# Patient Record
Sex: FEMALE | Race: BLACK OR AFRICAN AMERICAN
[De-identification: names, ages, dates, MRNs, and addresses within clinical notes are randomized per-mention and may not be internally consistent; named-entity substitution may affect disease eponyms.]

---

## 2022-10-11 ENCOUNTER — HOSPITAL ENCOUNTER (INPATIENT)
Dept: HOSPITAL 74 - YASAS | Age: 27
LOS: 3 days | Discharge: LEFT BEFORE DISCHARGE | DRG: 770 | End: 2022-10-14
Attending: SURGERY | Admitting: ALLERGY & IMMUNOLOGY
Payer: COMMERCIAL

## 2022-10-11 VITALS — BODY MASS INDEX: 48.3 KG/M2

## 2022-10-11 DIAGNOSIS — F10.230: Primary | ICD-10-CM

## 2022-10-11 DIAGNOSIS — I10: ICD-10-CM

## 2022-10-11 DIAGNOSIS — F43.10: ICD-10-CM

## 2022-10-11 DIAGNOSIS — F31.9: ICD-10-CM

## 2022-10-11 DIAGNOSIS — F19.282: ICD-10-CM

## 2022-10-11 DIAGNOSIS — L73.2: ICD-10-CM

## 2022-10-11 DIAGNOSIS — F17.210: ICD-10-CM

## 2022-10-11 DIAGNOSIS — F19.280: ICD-10-CM

## 2022-10-11 DIAGNOSIS — F12.20: ICD-10-CM

## 2022-10-11 DIAGNOSIS — E66.9: ICD-10-CM

## 2022-10-11 PROCEDURE — U0005 INFEC AGEN DETEC AMPLI PROBE: HCPCS

## 2022-10-11 PROCEDURE — U0003 INFECTIOUS AGENT DETECTION BY NUCLEIC ACID (DNA OR RNA); SEVERE ACUTE RESPIRATORY SYNDROME CORONAVIRUS 2 (SARS-COV-2) (CORONAVIRUS DISEASE [COVID-19]), AMPLIFIED PROBE TECHNIQUE, MAKING USE OF HIGH THROUGHPUT TECHNOLOGIES AS DESCRIBED BY CMS-2020-01-R: HCPCS

## 2022-10-11 PROCEDURE — HZ2ZZZZ DETOXIFICATION SERVICES FOR SUBSTANCE ABUSE TREATMENT: ICD-10-PCS | Performed by: SURGERY

## 2022-10-11 RX ADMIN — Medication SCH MG: at 22:35

## 2022-10-11 RX ADMIN — HYDROXYZINE PAMOATE SCH MG: 25 CAPSULE ORAL at 18:06

## 2022-10-11 RX ADMIN — METHOCARBAMOL PRN MG: 500 TABLET ORAL at 18:05

## 2022-10-11 RX ADMIN — NICOTINE SCH: 21 PATCH TRANSDERMAL at 18:06

## 2022-10-11 RX ADMIN — HYDROXYZINE PAMOATE SCH MG: 25 CAPSULE ORAL at 22:35

## 2022-10-12 LAB
ALBUMIN SERPL-MCNC: 2.9 G/DL (ref 3.4–5)
ALP SERPL-CCNC: 72 U/L (ref 45–117)
ALT SERPL-CCNC: 28 U/L (ref 13–61)
ANION GAP SERPL CALC-SCNC: 7 MMOL/L (ref 8–16)
AST SERPL-CCNC: 21 U/L (ref 15–37)
BILIRUB SERPL-MCNC: 0.5 MG/DL (ref 0.2–1)
BUN SERPL-MCNC: 10.1 MG/DL (ref 7–18)
CALCIUM SERPL-MCNC: 8.7 MG/DL (ref 8.5–10.1)
CHLORIDE SERPL-SCNC: 107 MMOL/L (ref 98–107)
CO2 SERPL-SCNC: 27 MMOL/L (ref 21–32)
CREAT SERPL-MCNC: 0.9 MG/DL (ref 0.55–1.3)
DEPRECATED RDW RBC AUTO: 15.3 % (ref 11.6–15.6)
GLUCOSE SERPL-MCNC: 99 MG/DL (ref 74–106)
HCT VFR BLD CALC: 37.1 % (ref 32.4–45.2)
HGB BLD-MCNC: 12.2 GM/DL (ref 10.7–15.3)
MCH RBC QN AUTO: 31.6 PG (ref 25.7–33.7)
MCHC RBC AUTO-ENTMCNC: 32.9 G/DL (ref 32–36)
MCV RBC: 96 FL (ref 80–96)
PLATELET # BLD AUTO: 282 10^3/UL (ref 134–434)
PMV BLD: 8.3 FL (ref 7.5–11.1)
PROT SERPL-MCNC: 6.6 G/DL (ref 6.4–8.2)
RBC # BLD AUTO: 3.86 M/MM3 (ref 3.6–5.2)
SODIUM SERPL-SCNC: 140 MMOL/L (ref 136–145)
WBC # BLD AUTO: 7.5 K/MM3 (ref 4–10)

## 2022-10-12 RX ADMIN — HYDROXYZINE PAMOATE SCH MG: 25 CAPSULE ORAL at 13:30

## 2022-10-12 RX ADMIN — Medication SCH TAB: at 11:11

## 2022-10-12 RX ADMIN — HYDROXYZINE PAMOATE SCH MG: 25 CAPSULE ORAL at 06:45

## 2022-10-12 RX ADMIN — NICOTINE SCH: 21 PATCH TRANSDERMAL at 11:11

## 2022-10-12 RX ADMIN — HYDROXYZINE PAMOATE SCH MG: 25 CAPSULE ORAL at 18:17

## 2022-10-12 RX ADMIN — Medication SCH MG: at 22:22

## 2022-10-12 RX ADMIN — HYDROXYZINE PAMOATE SCH: 25 CAPSULE ORAL at 22:27

## 2022-10-12 RX ADMIN — HYDROXYZINE PAMOATE SCH MG: 25 CAPSULE ORAL at 11:11

## 2022-10-12 RX ADMIN — LISINOPRIL SCH MG: 10 TABLET ORAL at 22:22

## 2022-10-12 RX ADMIN — LISINOPRIL SCH MG: 10 TABLET ORAL at 12:13

## 2022-10-13 RX ADMIN — HYDROXYZINE PAMOATE SCH MG: 25 CAPSULE ORAL at 17:56

## 2022-10-13 RX ADMIN — METHOCARBAMOL PRN MG: 500 TABLET ORAL at 17:56

## 2022-10-13 RX ADMIN — METHOCARBAMOL PRN MG: 500 TABLET ORAL at 10:19

## 2022-10-13 RX ADMIN — HYDROXYZINE PAMOATE SCH MG: 25 CAPSULE ORAL at 10:18

## 2022-10-13 RX ADMIN — LISINOPRIL SCH MG: 10 TABLET ORAL at 10:18

## 2022-10-13 RX ADMIN — LISINOPRIL SCH MG: 10 TABLET ORAL at 22:19

## 2022-10-13 RX ADMIN — Medication SCH MG: at 22:19

## 2022-10-13 RX ADMIN — Medication SCH TAB: at 10:18

## 2022-10-13 RX ADMIN — HYDROXYZINE PAMOATE SCH MG: 25 CAPSULE ORAL at 22:20

## 2022-10-13 RX ADMIN — NICOTINE PRN MG: 4 INHALANT RESPIRATORY (INHALATION) at 10:22

## 2022-10-13 RX ADMIN — HYDROXYZINE PAMOATE SCH MG: 25 CAPSULE ORAL at 05:36

## 2022-10-13 RX ADMIN — HYDROXYZINE PAMOATE SCH MG: 25 CAPSULE ORAL at 13:52

## 2022-10-13 RX ADMIN — NICOTINE SCH: 21 PATCH TRANSDERMAL at 10:19

## 2022-10-14 VITALS — RESPIRATION RATE: 18 BRPM

## 2022-10-14 VITALS — DIASTOLIC BLOOD PRESSURE: 98 MMHG | TEMPERATURE: 98 F | SYSTOLIC BLOOD PRESSURE: 141 MMHG

## 2022-10-14 VITALS — HEART RATE: 104 BPM

## 2022-10-14 RX ADMIN — METHOCARBAMOL PRN MG: 500 TABLET ORAL at 10:30

## 2022-10-14 RX ADMIN — HYDROXYZINE PAMOATE SCH MG: 25 CAPSULE ORAL at 05:33

## 2022-10-14 RX ADMIN — Medication SCH TAB: at 10:31

## 2022-10-14 RX ADMIN — NICOTINE SCH: 21 PATCH TRANSDERMAL at 10:31

## 2022-10-14 RX ADMIN — NICOTINE PRN MG: 4 INHALANT RESPIRATORY (INHALATION) at 12:50

## 2023-01-09 ENCOUNTER — INPATIENT (INPATIENT)
Facility: HOSPITAL | Age: 28
LOS: 0 days | Discharge: LEFT BEFORE DISCHARGE | DRG: 103 | End: 2023-01-10
Attending: INTERNAL MEDICINE | Admitting: INTERNAL MEDICINE
Payer: MEDICAID

## 2023-01-09 VITALS
WEIGHT: 283.51 LBS | SYSTOLIC BLOOD PRESSURE: 177 MMHG | OXYGEN SATURATION: 100 % | RESPIRATION RATE: 18 BRPM | HEART RATE: 92 BPM | TEMPERATURE: 98 F | DIASTOLIC BLOOD PRESSURE: 134 MMHG

## 2023-01-09 LAB
ALBUMIN SERPL ELPH-MCNC: 3.5 G/DL — SIGNIFICANT CHANGE UP (ref 3.5–5)
ALP SERPL-CCNC: 74 U/L — SIGNIFICANT CHANGE UP (ref 40–120)
ALT FLD-CCNC: 26 U/L DA — SIGNIFICANT CHANGE UP (ref 10–60)
ANION GAP SERPL CALC-SCNC: 4 MMOL/L — LOW (ref 5–17)
AST SERPL-CCNC: 18 U/L — SIGNIFICANT CHANGE UP (ref 10–40)
BASOPHILS # BLD AUTO: 0.03 K/UL — SIGNIFICANT CHANGE UP (ref 0–0.2)
BASOPHILS NFR BLD AUTO: 0.3 % — SIGNIFICANT CHANGE UP (ref 0–2)
BILIRUB SERPL-MCNC: 0.5 MG/DL — SIGNIFICANT CHANGE UP (ref 0.2–1.2)
BUN SERPL-MCNC: 9 MG/DL — SIGNIFICANT CHANGE UP (ref 7–18)
CALCIUM SERPL-MCNC: 8.8 MG/DL — SIGNIFICANT CHANGE UP (ref 8.4–10.5)
CHLORIDE SERPL-SCNC: 109 MMOL/L — HIGH (ref 96–108)
CO2 SERPL-SCNC: 25 MMOL/L — SIGNIFICANT CHANGE UP (ref 22–31)
CREAT SERPL-MCNC: 0.88 MG/DL — SIGNIFICANT CHANGE UP (ref 0.5–1.3)
EGFR: 91 ML/MIN/1.73M2 — SIGNIFICANT CHANGE UP
EOSINOPHIL # BLD AUTO: 0.06 K/UL — SIGNIFICANT CHANGE UP (ref 0–0.5)
EOSINOPHIL NFR BLD AUTO: 0.6 % — SIGNIFICANT CHANGE UP (ref 0–6)
GLUCOSE SERPL-MCNC: 102 MG/DL — HIGH (ref 70–99)
HCG SERPL-ACNC: <1 MIU/ML — SIGNIFICANT CHANGE UP
HCT VFR BLD CALC: 38.4 % — SIGNIFICANT CHANGE UP (ref 34.5–45)
HGB BLD-MCNC: 13.1 G/DL — SIGNIFICANT CHANGE UP (ref 11.5–15.5)
IMM GRANULOCYTES NFR BLD AUTO: 0.3 % — SIGNIFICANT CHANGE UP (ref 0–0.9)
LYMPHOCYTES # BLD AUTO: 3.24 K/UL — SIGNIFICANT CHANGE UP (ref 1–3.3)
LYMPHOCYTES # BLD AUTO: 35 % — SIGNIFICANT CHANGE UP (ref 13–44)
MCHC RBC-ENTMCNC: 31.5 PG — SIGNIFICANT CHANGE UP (ref 27–34)
MCHC RBC-ENTMCNC: 34.1 GM/DL — SIGNIFICANT CHANGE UP (ref 32–36)
MCV RBC AUTO: 92.3 FL — SIGNIFICANT CHANGE UP (ref 80–100)
MONOCYTES # BLD AUTO: 0.49 K/UL — SIGNIFICANT CHANGE UP (ref 0–0.9)
MONOCYTES NFR BLD AUTO: 5.3 % — SIGNIFICANT CHANGE UP (ref 2–14)
NEUTROPHILS # BLD AUTO: 5.41 K/UL — SIGNIFICANT CHANGE UP (ref 1.8–7.4)
NEUTROPHILS NFR BLD AUTO: 58.5 % — SIGNIFICANT CHANGE UP (ref 43–77)
NRBC # BLD: 0 /100 WBCS — SIGNIFICANT CHANGE UP (ref 0–0)
PLATELET # BLD AUTO: 340 K/UL — SIGNIFICANT CHANGE UP (ref 150–400)
POTASSIUM SERPL-MCNC: 4.2 MMOL/L — SIGNIFICANT CHANGE UP (ref 3.5–5.3)
POTASSIUM SERPL-SCNC: 4.2 MMOL/L — SIGNIFICANT CHANGE UP (ref 3.5–5.3)
PROT SERPL-MCNC: 7.9 G/DL — SIGNIFICANT CHANGE UP (ref 6–8.3)
RBC # BLD: 4.16 M/UL — SIGNIFICANT CHANGE UP (ref 3.8–5.2)
RBC # FLD: 14.4 % — SIGNIFICANT CHANGE UP (ref 10.3–14.5)
SODIUM SERPL-SCNC: 138 MMOL/L — SIGNIFICANT CHANGE UP (ref 135–145)
WBC # BLD: 9.26 K/UL — SIGNIFICANT CHANGE UP (ref 3.8–10.5)
WBC # FLD AUTO: 9.26 K/UL — SIGNIFICANT CHANGE UP (ref 3.8–10.5)

## 2023-01-09 PROCEDURE — 99285 EMERGENCY DEPT VISIT HI MDM: CPT

## 2023-01-09 PROCEDURE — 93010 ELECTROCARDIOGRAM REPORT: CPT

## 2023-01-09 RX ORDER — ACETAMINOPHEN 500 MG
650 TABLET ORAL ONCE
Refills: 0 | Status: COMPLETED | OUTPATIENT
Start: 2023-01-09 | End: 2023-01-09

## 2023-01-09 RX ORDER — DIPHENHYDRAMINE HCL 50 MG
25 CAPSULE ORAL ONCE
Refills: 0 | Status: COMPLETED | OUTPATIENT
Start: 2023-01-09 | End: 2023-01-09

## 2023-01-09 RX ORDER — ACETAMINOPHEN 500 MG
1000 TABLET ORAL ONCE
Refills: 0 | Status: COMPLETED | OUTPATIENT
Start: 2023-01-09 | End: 2023-01-09

## 2023-01-09 RX ORDER — METOCLOPRAMIDE HCL 10 MG
10 TABLET ORAL ONCE
Refills: 0 | Status: COMPLETED | OUTPATIENT
Start: 2023-01-09 | End: 2023-01-09

## 2023-01-09 RX ORDER — SODIUM CHLORIDE 9 MG/ML
1000 INJECTION INTRAMUSCULAR; INTRAVENOUS; SUBCUTANEOUS ONCE
Refills: 0 | Status: DISCONTINUED | OUTPATIENT
Start: 2023-01-09 | End: 2023-01-09

## 2023-01-09 NOTE — ED PROVIDER NOTE - CROS ED NEURO POS
HEADACHE Metronidazole Pregnancy And Lactation Text: This medication is Pregnancy Category B and considered safe during pregnancy.  It is also excreted in breast milk.

## 2023-01-09 NOTE — ED PROVIDER NOTE - CLINICAL SUMMARY MEDICAL DECISION MAKING FREE TEXT BOX
11:15p Repeat /107.  Differential diagnosis includes, but not limited to: Migraine headaches, intracranial pathology, uncontrolled hypertension.  Will provide Benadryl, Reglan, acetaminophen and follow-up diagnostics including CT of head.  Patient currently in no distress and agrees with plan. 11:15p Repeat /107.  Differential diagnosis includes, but not limited to: Migraine headaches, intracranial pathology, uncontrolled hypertension.  Will provide Benadryl, Reglan, acetaminophen and follow-up diagnostics including CT of head.  Patient currently in no distress and agrees with plan.    CT head reported no IC pathology.  Pt states HA mild when laying but reproducible with up right position.    MAR and Dr. Guzman endorsed. Pt agrees with admission for BP and neuro monitoring.

## 2023-01-09 NOTE — ED PROVIDER NOTE - PHYSICAL EXAMINATION
Kernig and Brudzinski signs area negative, no petechiae, no photophobia, no dysarthria, no facial asymmetry, no focal deficits.   OD/OS 20/20  NIH stroke scale is zero. Pt passed dysphagia screen.

## 2023-01-09 NOTE — ED ADULT NURSE NOTE - NSIMPLEMENTINTERV_GEN_ALL_ED
psychiatric evaluation Implemented All Universal Safety Interventions:  [place] to call system. Call bell, personal items and telephone within reach. Instruct patient to call for assistance. Room bathroom lighting operational. Non-slip footwear when patient is off stretcher. Physically safe environment: no spills, clutter or unnecessary equipment. Stretcher in lowest position, wheels locked, appropriate side rails in place.

## 2023-01-09 NOTE — ED ADULT NURSE NOTE - WILL THE PATIENT ACCEPT THE PFIZER COVID-19 VACCINE IF ELIGIBLE AND IT IS AVAILABLE?
Detail Level: Simple Quality 130: Documentation Of Current Medications In The Medical Record: Current Medications Documented Quality 402: Tobacco Use And Help With Quitting Among Adolescents: Patient screened for tobacco and never smoked No

## 2023-01-09 NOTE — ED PROVIDER NOTE - OBJECTIVE STATEMENT
27-year-old female states she woke at 6 AM with left temporal headache that progressed to diffuse circumferential headache.  No reported fever, no vision changes, no neck pain.  Patient describes headache as pressure sensation.  Patient also states has had similar headaches intermittently for past month.  Patient states last similar headache was 2 days ago.  Patient states headache with minimal relief with Aleve.  No motor weakness, no loss of consciousness, no difficulty speaking.

## 2023-01-10 VITALS
RESPIRATION RATE: 19 BRPM | HEART RATE: 88 BPM | OXYGEN SATURATION: 99 % | TEMPERATURE: 98 F | SYSTOLIC BLOOD PRESSURE: 188 MMHG | DIASTOLIC BLOOD PRESSURE: 116 MMHG

## 2023-01-10 DIAGNOSIS — R51.9 HEADACHE, UNSPECIFIED: ICD-10-CM

## 2023-01-10 DIAGNOSIS — Z29.9 ENCOUNTER FOR PROPHYLACTIC MEASURES, UNSPECIFIED: ICD-10-CM

## 2023-01-10 DIAGNOSIS — G43.809 OTHER MIGRAINE, NOT INTRACTABLE, WITHOUT STATUS MIGRAINOSUS: ICD-10-CM

## 2023-01-10 DIAGNOSIS — G43.909 MIGRAINE, UNSPECIFIED, NOT INTRACTABLE, WITHOUT STATUS MIGRAINOSUS: ICD-10-CM

## 2023-01-10 LAB
ANION GAP SERPL CALC-SCNC: 6 MMOL/L — SIGNIFICANT CHANGE UP (ref 5–17)
BUN SERPL-MCNC: 8 MG/DL — SIGNIFICANT CHANGE UP (ref 7–18)
CALCIUM SERPL-MCNC: 8.5 MG/DL — SIGNIFICANT CHANGE UP (ref 8.4–10.5)
CHLORIDE SERPL-SCNC: 109 MMOL/L — HIGH (ref 96–108)
CO2 SERPL-SCNC: 25 MMOL/L — SIGNIFICANT CHANGE UP (ref 22–31)
CREAT SERPL-MCNC: 1.01 MG/DL — SIGNIFICANT CHANGE UP (ref 0.5–1.3)
EGFR: 77 ML/MIN/1.73M2 — SIGNIFICANT CHANGE UP
GLUCOSE SERPL-MCNC: 141 MG/DL — HIGH (ref 70–99)
HCT VFR BLD CALC: 38.3 % — SIGNIFICANT CHANGE UP (ref 34.5–45)
HGB BLD-MCNC: 12.6 G/DL — SIGNIFICANT CHANGE UP (ref 11.5–15.5)
MAGNESIUM SERPL-MCNC: 1.9 MG/DL — SIGNIFICANT CHANGE UP (ref 1.6–2.6)
MCHC RBC-ENTMCNC: 31.1 PG — SIGNIFICANT CHANGE UP (ref 27–34)
MCHC RBC-ENTMCNC: 32.9 GM/DL — SIGNIFICANT CHANGE UP (ref 32–36)
MCV RBC AUTO: 94.6 FL — SIGNIFICANT CHANGE UP (ref 80–100)
NRBC # BLD: 0 /100 WBCS — SIGNIFICANT CHANGE UP (ref 0–0)
PHOSPHATE SERPL-MCNC: 2.3 MG/DL — LOW (ref 2.5–4.5)
PLATELET # BLD AUTO: 313 K/UL — SIGNIFICANT CHANGE UP (ref 150–400)
POTASSIUM SERPL-MCNC: 3.9 MMOL/L — SIGNIFICANT CHANGE UP (ref 3.5–5.3)
POTASSIUM SERPL-SCNC: 3.9 MMOL/L — SIGNIFICANT CHANGE UP (ref 3.5–5.3)
RBC # BLD: 4.05 M/UL — SIGNIFICANT CHANGE UP (ref 3.8–5.2)
RBC # FLD: 14.8 % — HIGH (ref 10.3–14.5)
SARS-COV-2 RNA SPEC QL NAA+PROBE: SIGNIFICANT CHANGE UP
SODIUM SERPL-SCNC: 140 MMOL/L — SIGNIFICANT CHANGE UP (ref 135–145)
TROPONIN I, HIGH SENSITIVITY RESULT: 12 NG/L — SIGNIFICANT CHANGE UP
TROPONIN I, HIGH SENSITIVITY RESULT: 9.3 NG/L — SIGNIFICANT CHANGE UP
WBC # BLD: 7.36 K/UL — SIGNIFICANT CHANGE UP (ref 3.8–10.5)
WBC # FLD AUTO: 7.36 K/UL — SIGNIFICANT CHANGE UP (ref 3.8–10.5)

## 2023-01-10 PROCEDURE — 12345: CPT | Mod: NC

## 2023-01-10 PROCEDURE — 99223 1ST HOSP IP/OBS HIGH 75: CPT

## 2023-01-10 PROCEDURE — 36415 COLL VENOUS BLD VENIPUNCTURE: CPT

## 2023-01-10 PROCEDURE — 70450 CT HEAD/BRAIN W/O DYE: CPT | Mod: MA

## 2023-01-10 PROCEDURE — 71045 X-RAY EXAM CHEST 1 VIEW: CPT | Mod: 26

## 2023-01-10 PROCEDURE — 96375 TX/PRO/DX INJ NEW DRUG ADDON: CPT

## 2023-01-10 PROCEDURE — 70450 CT HEAD/BRAIN W/O DYE: CPT | Mod: 26,MA

## 2023-01-10 PROCEDURE — 99285 EMERGENCY DEPT VISIT HI MDM: CPT | Mod: 25

## 2023-01-10 PROCEDURE — 84484 ASSAY OF TROPONIN QUANT: CPT

## 2023-01-10 PROCEDURE — 84100 ASSAY OF PHOSPHORUS: CPT

## 2023-01-10 PROCEDURE — 85027 COMPLETE CBC AUTOMATED: CPT

## 2023-01-10 PROCEDURE — 85025 COMPLETE CBC W/AUTO DIFF WBC: CPT

## 2023-01-10 PROCEDURE — 96374 THER/PROPH/DIAG INJ IV PUSH: CPT

## 2023-01-10 PROCEDURE — 71045 X-RAY EXAM CHEST 1 VIEW: CPT

## 2023-01-10 PROCEDURE — 87635 SARS-COV-2 COVID-19 AMP PRB: CPT

## 2023-01-10 PROCEDURE — 80053 COMPREHEN METABOLIC PANEL: CPT

## 2023-01-10 PROCEDURE — 84702 CHORIONIC GONADOTROPIN TEST: CPT

## 2023-01-10 PROCEDURE — 83735 ASSAY OF MAGNESIUM: CPT

## 2023-01-10 PROCEDURE — 93005 ELECTROCARDIOGRAM TRACING: CPT

## 2023-01-10 PROCEDURE — 99222 1ST HOSP IP/OBS MODERATE 55: CPT

## 2023-01-10 PROCEDURE — 80048 BASIC METABOLIC PNL TOTAL CA: CPT

## 2023-01-10 RX ORDER — KETOROLAC TROMETHAMINE 30 MG/ML
30 SYRINGE (ML) INJECTION EVERY 6 HOURS
Refills: 0 | Status: DISCONTINUED | OUTPATIENT
Start: 2023-01-10 | End: 2023-01-10

## 2023-01-10 RX ORDER — METOCLOPRAMIDE HCL 10 MG
10 TABLET ORAL EVERY 8 HOURS
Refills: 0 | Status: DISCONTINUED | OUTPATIENT
Start: 2023-01-10 | End: 2023-01-10

## 2023-01-10 RX ORDER — DIPHENHYDRAMINE HCL 50 MG
25 CAPSULE ORAL EVERY 8 HOURS
Refills: 0 | Status: DISCONTINUED | OUTPATIENT
Start: 2023-01-10 | End: 2023-01-10

## 2023-01-10 RX ORDER — AMLODIPINE BESYLATE 2.5 MG/1
5 TABLET ORAL DAILY
Refills: 0 | Status: DISCONTINUED | OUTPATIENT
Start: 2023-01-10 | End: 2023-01-10

## 2023-01-10 RX ORDER — KETOROLAC TROMETHAMINE 30 MG/ML
30 SYRINGE (ML) INJECTION EVERY 8 HOURS
Refills: 0 | Status: DISCONTINUED | OUTPATIENT
Start: 2023-01-10 | End: 2023-01-10

## 2023-01-10 RX ORDER — AMLODIPINE BESYLATE 2.5 MG/1
1 TABLET ORAL
Qty: 30 | Refills: 0
Start: 2023-01-10 | End: 2023-02-08

## 2023-01-10 RX ORDER — ACETAMINOPHEN 500 MG
650 TABLET ORAL EVERY 6 HOURS
Refills: 0 | Status: DISCONTINUED | OUTPATIENT
Start: 2023-01-10 | End: 2023-01-10

## 2023-01-10 RX ORDER — METOPROLOL TARTRATE 50 MG
2.5 TABLET ORAL ONCE
Refills: 0 | Status: COMPLETED | OUTPATIENT
Start: 2023-01-10 | End: 2023-01-10

## 2023-01-10 RX ADMIN — Medication 30 MILLIGRAM(S): at 09:23

## 2023-01-10 RX ADMIN — Medication 1000 MILLIGRAM(S): at 05:42

## 2023-01-10 RX ADMIN — Medication 30 MILLIGRAM(S): at 16:18

## 2023-01-10 RX ADMIN — Medication 400 MILLIGRAM(S): at 01:51

## 2023-01-10 RX ADMIN — Medication 10 MILLIGRAM(S): at 16:18

## 2023-01-10 RX ADMIN — Medication 30 MILLIGRAM(S): at 09:53

## 2023-01-10 RX ADMIN — Medication 2.5 MILLIGRAM(S): at 12:20

## 2023-01-10 RX ADMIN — Medication 25 MILLIGRAM(S): at 16:18

## 2023-01-10 RX ADMIN — Medication 30 MILLIGRAM(S): at 16:40

## 2023-01-10 RX ADMIN — Medication 25 MILLIGRAM(S): at 01:50

## 2023-01-10 RX ADMIN — Medication 10 MILLIGRAM(S): at 01:51

## 2023-01-10 NOTE — CONSULT NOTE ADULT - SUBJECTIVE AND OBJECTIVE BOX
***TEMPLATE ONLY***      Patient is a 27y old  Female who presents with a chief complaint of headache (10 Humza 2023 05:52)      HPI:  27 year old F from home with PMH of hydradenitis suppurativa presents with 2 weeks history of intermittent throbbing headaches on the left posterior head. She states the pain worsens when laying down or leaning forward, not relieved by Aleve. Also reports nausea with vomiting, photophobia but no vision changes. States that 2 weeks ago she was moving a  heavy bed with assistance but the weight was very heavy and she had whiplash. Denies fever, chills, diarrhea, uriinary or bowel changes, weakness, head trauma, fall, recent illness, travel, or sick contacts. Denies any new stress at home    ED Course  Vitals: /134 P 92 R 18 T 98.4 F SPo2 100% RA  Meds: IV acetaminophen, PO acetaminophen, reglan, benadryl (10 Humza 2023 05:52)         Neurological Review of Systems:  No difficulty with language.  No vision loss or double vision.  No dizziness, vertigo or new hearing loss.  No difficulty with speech or swallowing.  No focal weakness.  No focal sensory changes.  No numbness or tingling in the bilateral lower extremities.  No difficulty with balance.  No difficulty with ambulation.        MEDICATIONS  (STANDING):    MEDICATIONS  (PRN):  acetaminophen     Tablet .. 650 milliGRAM(s) Oral every 6 hours PRN Temp greater or equal to 38C (100.4F), Mild Pain (1 - 3)  ketorolac   Injectable 30 milliGRAM(s) IV Push every 6 hours PRN Severe Pain (7 - 10)  metoclopramide Injectable 10 milliGRAM(s) IV Push every 8 hours PRN nausea;headache    Allergies    No Known Allergies    Intolerances      PAST MEDICAL & SURGICAL HISTORY:  No pertinent past medical history      No significant past surgical history        FAMILY HISTORY:  No pertinent family history in first degree relatives      SOCIAL HISTORY: non smoker/ former smoker/ active smoker    Review of Systems:  Constitutional: No generalized weakness. No fevers or chills.                    Eyes, Ears, Mouth, Throat: No vision loss   Respiratory: No shortness of breath or cough.                                Cardiovascular: No chest pain or palpitations  Gastrointestinal: No nausea or vomiting.                                         Genitourinary: No urinary incontinence or burning on urination.  Musculoskeletal: No joint pain.                                                           Dermatologic: No rash.  Neurological: as per HPI                                                                      Psychiatric: No behavioral problems.  Endocrine: No known hypoglycemia.               Hematologic/Lymphatic: No easy bleeding.    O:  Vital Signs Last 24 Hrs  T(C): 36.9 (10 Humza 2023 11:41), Max: 36.9 (09 Jan 2023 20:02)  T(F): 98.4 (10 Humza 2023 11:41), Max: 98.5 (10 Humza 2023 00:43)  HR: 94 (10 Humza 2023 11:41) (92 - 97)  BP: 159/104 (10 Humza 2023 11:41) (158/100 - 177/134)  BP(mean): --  RR: 19 (10 Humza 2023 11:41) (18 - 19)  SpO2: 100% (10 Humza 2023 11:41) (100% - 100%)    Parameters below as of 10 Humza 2023 11:41  Patient On (Oxygen Delivery Method): room air        General Exam:   General appearance: No acute distress                 Cardiovascular: Pedal dorsalis pulses intact bilaterally    Mental Status: Orientated to self, date and place.  Attention intact.  No dysarthria, aphasia or neglect.  Knowledge intact.  Registration intact.  Short and long term memory grossly intact.      Cranial Nerves: CN I - not tested.  PERRL, EOMI, VFF, no nystagmus or diplopia.  No APD.  Fundi not visualized.  CN V1-3 intact to light touch and pinprick.  No facial asymmetry.  Hearing intact to finger rub bilaterally.  Tongue, uvula and palate midline.  Sternocleidomastoid and Trapezius intact bilaterally.    Motor:   Tone: normal.                  Strength intact throughout  No pronator drift bilaterally                      No dysmetria on finger-nose-finger or heel-shin-heel  No truncal ataxia.  No resting, postural or action tremor.  No myoclonus.    Sensation: intact to light touch, pinprick, vibration and proprioception    Deep Tendon Reflexes: 1+ bilateral biceps, triceps, brachioradialis, knee and ankle  Toes flexor bilaterally    Gait: normal and stable.  Rhomberg -craig.    Other:     LABS:                        12.6   7.36  )-----------( 313      ( 10 Humza 2023 10:10 )             38.3     01-10    140  |  109<H>  |  8   ----------------------------<  141<H>  3.9   |  25  |  1.01    Ca    8.5      10 Jan 2023 10:10  Phos  2.3     01-10  Mg     1.9     01-10    TPro  7.9  /  Alb  3.5  /  TBili  0.5  /  DBili  x   /  AST  18  /  ALT  26  /  AlkPhos  74  01-09            RADIOLOGY & ADDITIONAL STUDIES:    EKG:   < from: CT Head No Cont (01.10.23 @ 02:19) >  No large territory acute infarct, intracranial hemorrhage, or mass effect.    MRI brain could be considered if symptoms persist.    < end of copied text >       Patient is a 27y old  Female who presents with a chief complaint of headache (10 Humza 2023 05:52)      HPI:  27 year old F from home with PMH of hydradenitis suppurativa presents with 2 weeks history of intermittent throbbing headaches on the left posterior head. She states the pain worsens when laying down or leaning forward, not relieved by Aleve. Also reports nausea with vomiting, photophobia but no vision changes.  Has had similar headaches but no prior photophobia and the headaches have not lasted as long.  No focal weakness or sensory loss.  NO aura.    patient denies MVA or concussion.    ED Course  Vitals: /134 P 92 R 18 T 98.4 F SPo2 100% RA  Meds: IV acetaminophen, PO acetaminophen, reglan, benadryl (10 Humza 2023 05:52)    Neurological Review of Systems:  No difficulty with language.  No vision loss or double vision.  No dizziness, vertigo or new hearing loss.  No difficulty with speech or swallowing.  No focal weakness.  No focal sensory changes.  No difficulty with balance.  No difficulty with ambulation.        MEDICATIONS  (STANDING):    MEDICATIONS  (PRN):  acetaminophen     Tablet .. 650 milliGRAM(s) Oral every 6 hours PRN Temp greater or equal to 38C (100.4F), Mild Pain (1 - 3)  ketorolac   Injectable 30 milliGRAM(s) IV Push every 6 hours PRN Severe Pain (7 - 10)  metoclopramide Injectable 10 milliGRAM(s) IV Push every 8 hours PRN nausea;headache    Allergies    No Known Allergies    Intolerances      PAST MEDICAL & SURGICAL HISTORY:  No pertinent past medical history      No significant past surgical history        FAMILY HISTORY:  No pertinent family history in first degree relatives      SOCIAL HISTORY: non smoker    Review of Systems:  Constitutional:  No fevers                  Eyes, Ears, Mouth, Throat: No vision loss   Respiratory: No cough.                                Cardiovascular: No chest pain   Gastrointestinal: + nausea. No vomiting.                                         Genitourinary: No burning on urination.  Musculoskeletal: No joint pain.                                                           Dermatologic: No rash.  Neurological: as per HPI                                                                      Psychiatric: No behavioral problems.  Endocrine: No known hypoglycemia.               Hematologic/Lymphatic: No easy bleeding.    O:  Vital Signs Last 24 Hrs  T(C): 36.9 (10 Humza 2023 11:41), Max: 36.9 (09 Jan 2023 20:02)  T(F): 98.4 (10 Humza 2023 11:41), Max: 98.5 (10 Humza 2023 00:43)  HR: 94 (10 Humza 2023 11:41) (92 - 97)  BP: 159/104 (10 Humza 2023 11:41) (158/100 - 177/134)  BP(mean): --  RR: 19 (10 Humza 2023 11:41) (18 - 19)  SpO2: 100% (10 Humza 2023 11:41) (100% - 100%)    Parameters below as of 10 Humza 2023 11:41  Patient On (Oxygen Delivery Method): room air        General Exam:   General appearance: No acute distress                 Cardiovascular: Pedal dorsalis pulses intact bilaterally    Mental Status: Orientated to self, date and place.  Attention intact.  No dysarthria, aphasia or neglect.  Knowledge intact.  Registration intact.  Short and long term memory grossly intact.      Cranial Nerves: CN I - not tested.  PERRL, EOMI, VFF, no nystagmus or diplopia.  No APD.  Fundi not visualized.  CN V1-3 intact to light touch.  No facial asymmetry.  Hearing intact to finger rub bilaterally.  Tongue, uvula and palate midline.  Sternocleidomastoid and Trapezius intact bilaterally.    Motor:   Tone: normal.                  Strength intact throughout  No pronator drift bilaterally                      No dysmetria on finger-nose-finger or heel-shin-heel  No truncal ataxia.  No resting, postural or action tremor.  No myoclonus.    Sensation: intact to light touch    Deep Tendon Reflexes: 1+ bilateral biceps, triceps, brachioradialis, knee and ankle  Toes flexor bilaterally    Gait: normal and stable.      Other:     LABS:                        12.6   7.36  )-----------( 313      ( 10 Humza 2023 10:10 )             38.3     01-10    140  |  109<H>  |  8   ----------------------------<  141<H>  3.9   |  25  |  1.01    Ca    8.5      10 Humza 2023 10:10  Phos  2.3     01-10  Mg     1.9     01-10    TPro  7.9  /  Alb  3.5  /  TBili  0.5  /  DBili  x   /  AST  18  /  ALT  26  /  AlkPhos  74  01-09            RADIOLOGY & ADDITIONAL STUDIES:    EKG:   < from: CT Head No Cont (01.10.23 @ 02:19) > (images reviewed)  No large territory acute infarct, intracranial hemorrhage, or mass effect.    MRI brain could be considered if symptoms persist.    < end of copied text >

## 2023-01-10 NOTE — DISCHARGE NOTE NURSING/CASE MANAGEMENT/SOCIAL WORK - PATIENT PORTAL LINK FT
You can access the FollowMyHealth Patient Portal offered by [facility] by registering at the following website: [email or web address]. By joining [employer]’s FollowMyHealth portal, you will also be able to view your health information using other applications (apps) compatible with our system.

## 2023-01-10 NOTE — DISCHARGE NOTE PROVIDER - CARE PROVIDERS DIRECT ADDRESSES
[email or web address]

## 2023-01-10 NOTE — DISCHARGE NOTE PROVIDER - NSDCCPCAREPLAN_GEN_ALL_CORE_FT
PRINCIPAL DISCHARGE DIAGNOSIS  Diagnosis: Headache  Assessment and Plan of Treatment: You presented with 2 weeks history of intermittent throbbing left sided headaches admitted for further neurological work up.   Your CT head was negative for any acute findings. You also received migraine cocktail in ED with Reglan, benadryl and Toradol. This did not improve your headache. You were evaluated by a neurologist. As per neurologist evaluation your headache was secondary to migraine. Neurologist recommended to continue the migraine cocktail with Reglan, benadryl and Toradol for 3 more doses and re-evaluate at that time. However, you did not want to stay in the hospital and chose to leave against medical advice.   Please follow up with an outpatient neurologist for further management of your headache.         SECONDARY DISCHARGE DIAGNOSES  Diagnosis: Uncontrolled hypertension  Assessment and Plan of Treatment: You also had persistently elevated blood pressure for which you were started on amlodipine 5mg. You did not stay long enough to evaluate the response to medication.

## 2023-01-10 NOTE — H&P ADULT - HISTORY OF PRESENT ILLNESS
27 year old F from home with PMH of hydradenitis suppurativa presents with 2 weeks history of intermittent throbbing headaches on the left posterior head. She states the pain worsens when laying down or leaning forward, not relieved by Aleve. Also reports nausea with vomiting, photophobia but no vision changes. States that 2 weeks ago she was moving a  heavy bed with assistance but the weight was very heavy and she had whiplash. Denies fever, chills, diarrhea, uriinary or bowel changes, weakness, head trauma, fall, recent illness, travel, or sick contacts. Denies any new stress at home    ED Course  Vitals: /134 P 92 R 18 T 98.4 F SPo2 100% RA  Meds: IV acetaminophen, PO acetaminophen, reglan, benadryl

## 2023-01-10 NOTE — H&P ADULT - ATTENDING COMMENTS
Pt seen with SHEILA Collins at bedside  27 year old woman with no medical hx of note here with about 2 weeks of intermittent headaches ; described as throbbing and mostly left side of the head - frontal to occipital  +/- neck - worse in the morning and with  lying flat. Limited relief with sitting up.  Associated nausea, ? effortless emesis, some photophobia, left eye lacrimation but no blurry vision. No fevers. No improvement with OTC pain meds.   No prior episodes.  No hx of head trauma or falls  No hx of migraines    Significant     Vital Signs Last 24 Hrs  T(C): 36.9 (09 Jan 2023 20:02), Max: 36.9 (09 Jan 2023 20:02)  T(F): 98.4 (09 Jan 2023 20:02), Max: 98.4 (09 Jan 2023 20:02)  HR: 92 (09 Jan 2023 20:02) (92 - 92)  BP: 177/134 (09 Jan 2023 20:02) (177/134 - 177/134)  RR: 18 (09 Jan 2023 20:02) (18 - 18)  SpO2: 100% (09 Jan 2023 20:02) (100% - 100%)    Parameters below as of 09 Jan 2023 20:02  Patient On (Oxygen Delivery Method): room air Pt seen with SHEILA Collins at bedside  27 year old woman with no medical hx of note here with about 2 weeks of intermittent headaches ; described as throbbing and mostly left side of the head - frontal to occipital  +/- neck - worse in the morning and with  lying flat. Limited relief with sitting up.  Associated nausea, ? effortless emesis, some photophobia, left eye lacrimation but no blurry vision. No fevers. No improvement with OTC pain meds.   No prior episodes.  No hx of head trauma or falls  No hx of migraines. Hx of whiplash when carry heavy load few weeks back.  Unemployed; no stressors at home    Significant hx of tobacco use X 10 years - presently 5 sticks a day; at height wa smoking 1ppd    Vital Signs Last 24 Hrs  T(C): 36.9 (09 Jan 2023 20:02), Max: 36.9 (09 Jan 2023 20:02)  T(F): 98.4 (09 Jan 2023 20:02), Max: 98.4 (09 Jan 2023 20:02)  HR: 92 (09 Jan 2023 20:02) (92 - 92)  BP: 177/134 (09 Jan 2023 20:02) (177/134 - 177/134)  RR: 18 (09 Jan 2023 20:02) (18 - 18)  SpO2: 100% (09 Jan 2023 20:02) (100% - 100%)    Parameters below as of 09 Jan 2023 20:02  Patient On (Oxygen Delivery Method): room air    Labs                         13.1   9.26  )-----------( 340      ( 09 Jan 2023 22:40 )             38.4     01-09    138  |  109<H>  |  9   ----------------------------<  102<H>  4.2   |  25  |  0.88    Ca    8.8      09 Jan 2023 22:40    TPro  7.9  /  Alb  3.5  /  TBili  0.5  /  DBili  x   /  AST  18  /  ALT  26  /  AlkPhos  74  01-09    CT head - unremarkable   trop - negative     Impression   - Acute - subacute left sided throbbing headache   - Elevated BP   - Tobacco abuse   - Obesity       Etiology unclear   1) Elevated BP could be a cause or a result of the headache   Will control headache and re-evaluate BP   If elevation sustained, may require BP meds and outpatient work up for diagnosis     2) Suspected migraine  with unilateral throbbing, nausea, vomiting, photophobia    3) Suspected  tension headache  headache with involvement of the tightness in the neck    4)  some concern for raised ICP   - effortless vomiting , aggravation with lying flat , ?? confusion     A/P   Admit to medicine   Pain control - add NSAIDs, opioids only as needed  Neurology consult   If BP remains high after pain control, will consider intervention with BP meds  and follow up in outpatient for work up and treatment.

## 2023-01-10 NOTE — H&P ADULT - ASSESSMENT
27 year old F from home with PMH of hydradenitis suppurativa presents with 2 weeks history of intermittent throbbing left sided headaches admitted for further testing; neurological workup

## 2023-01-10 NOTE — DISCHARGE NOTE PROVIDER - CARE PROVIDER_API CALL
Sydney Reynolds)  Neurology  [address] [place] [id number]  Phone: [phone]  Fax: [phone]  Follow Up Time:     Jay Schafer)  Neurology  Epilepsy  [address] [facility], [address] [address]  Phone: [phone]  Fax: ()-  Follow Up Time:

## 2023-01-10 NOTE — CHART NOTE - NSCHARTNOTEFT_GEN_A_CORE
Patient admitted with recent onset Headache appear to be Migrainous in nature. CT Head negative  Patient was offered cocktail of medications with Toradol plus Reglan and Benadryl q 8 hrs x 3 doses to abort suspected Migraine  No relationship to Menstruation.   Patient was given a dose this afternoon after Neuro eval by Dr. Reynolds  Later this evening informed by PGY1 that Patient wishes to leave   Despite counseling by Housestaff to monitor the response to treatment and potentially consider a prophylactic regimen to prevent an attack if responds to above Rx, Patient was adamant on leaving the hospital  As per Housestaff patient wished same meds used for cocktail to be prescribed but she was counseled that these meds are for aborting an attack and not continued use outpt.   Housestaff was advised to recommend follow up with PCP/ Neuro as outpatient id symptoms persist Patient admitted with recent onset Headache appear to be Migrainous in nature. CT Head negative  Patient was offered cocktail of medications with Toradol plus Reglan and Benadryl q 8 hrs x 3 doses to abort suspected Migraine  No relationship to Menstruation.   Patient was given a dose this afternoon after Neuro eval by Dr. Reynolds  Later this evening informed by PGY1 that Patient wishes to leave   Despite counseling by Housestaff to monitor the response to treatment and potentially consider a prophylactic regimen to prevent an attack if responds to above Rx, Patient was adamant on leaving the hospital  As per Bonnietaff patient wished same meds used for cocktail to be prescribed but she was counseled that these meds are for aborting an attack and not continued use outpt.   Hussein was advised to recommend follow up with PCP/ Neuro as outpatient id symptoms persist  Patient also with elevated Blood pressure will be prescribed Amlodipine on discharge until she follows up with PCP

## 2023-01-10 NOTE — H&P ADULT - PROBLEM SELECTOR PLAN 1
patient presents with no PMH of headaches, now with 2 weeks of intermittent throbbing headaches without relief at home on aleve  minimal relief with tylenol, reglan, benadryl  improves with sitting, worsens with laying flat or leaning forward  also with elevated BP on admission, may be 2/2 uncontrolled HTN vs migraine vs tension headache vs muscle strain due to heavy lifting  CTH reveals no acute findings    - pain control  - Neurology consulted, Dr. Reynolds  - monitor BP, consider tx if elevated with resolution of pain

## 2023-01-10 NOTE — DISCHARGE NOTE NURSING/CASE MANAGEMENT/SOCIAL WORK - NSDCPEFALRISK_GEN_ALL_CORE
For information on Fall & Injury Prevention, visit: [email or web address] OR  [email or web address] OR  https://www.cdc.gov/steadi/patient.html

## 2023-01-10 NOTE — PATIENT PROFILE ADULT - FALL HARM RISK - HARM RISK INTERVENTIONS
Assistance with ambulation/Assistance OOB with selected safe patient handling equipment/Communicate Risk of Fall with Harm to all staff/Monitor gait and stability/Reinforce activity limits and safety measures with patient and family/Sit up slowly, dangle for a short time, stand at bedside before walking/Tailored Fall Risk Interventions/Visual Cue: Yellow wristband and red socks/Bed in lowest position, wheels locked, appropriate side rails in place/Call bell, personal items and telephone in reach/Instruct patient to call for assistance before getting out of bed or chair/Non-slip footwear when patient is out of bed/[place] to call system/Physically safe environment - no spills, clutter or unnecessary equipment/Purposeful Proactive Rounding/Room/bathroom lighting operational, light cord in reach

## 2023-01-10 NOTE — DISCHARGE NOTE PROVIDER - HOSPITAL COURSE
Patient is a 27 year old F from home with PMH of hydradenitis suppurativa presents with 2 weeks history of intermittent throbbing left sided headaches admitted for further neurological work up.     Patient CT head was negative for any acute findings. Patient also received migraine cocktail in ED with Reglan, benadryl and Toradol. This did not improve patient's headache. Patient was also evaluated by a neurologist. As per neurologist evaluation patient's headache was secondary to migraine. Neurologist recommended to continue the migraine cocktail with Reglan, benadryl and Toradol for 3 more doses and re-evaluate at that time. However, patient did not want to stay in the hospital and chose to leave against medical advice if there was no other testing to be done.     Patient also had persistently elevated blood pressure for which she was started on amlodipine 5mg. Patient did not stay long enough to evaluate the response to medication.     Patient was not stable for discharge and was advised not to leave.     Patient chose to leave against medical advice.     This is only a summary of the hospital course. Please refer to patient chart fur further details.

## 2023-01-10 NOTE — H&P ADULT - NSHPPHYSICALEXAM_GEN_ALL_CORE
PHYSICAL EXAM:  GENERAL: NAD, lying in bed comfortably, obese  HEAD:  Atraumatic, Normocephalic  EYES: EOMI, PERRLA, conjunctiva and sclera clear  ENT: Moist mucous membranes  NECK: Supple, No JVD  CHEST/LUNG: Clear to auscultation bilaterally; No rales, rhonchi, wheezing, or rubs. Unlabored respirations  HEART: Regular rate and rhythm; No murmurs, rubs, or gallops  ABDOMEN: Bowel sounds present; Soft, Nontender, Nondistended. No hepatomegally  EXTREMITIES:  2+ Peripheral Pulses, brisk capillary refill. No clubbing, cyanosis, or edema  NERVOUS SYSTEM:  Alert & Oriented X3, speech clear. No deficits   MSK: FROM all 4 extremities, full and equal strength  SKIN: No rashes or lesions

## 2023-01-10 NOTE — CONSULT NOTE ADULT - ASSESSMENT
migraine Patient has prolonged migraine, will recommend Torodol 30mg IV Y1dscop x 3doses max and Reglan/ Benadryl for nausea/ vomitting.  If no response to Trorodol, then start Medrol dose pack.    Outpatient neuro fu   dw resident and Dr. Carlos huynh call back with questions

## 2023-07-17 ENCOUNTER — EMERGENCY (EMERGENCY)
Facility: HOSPITAL | Age: 28
LOS: 1 days | Discharge: HOME | End: 2023-07-17
Attending: EMERGENCY MEDICINE | Admitting: EMERGENCY MEDICINE
Payer: MEDICAID

## 2023-07-17 VITALS
DIASTOLIC BLOOD PRESSURE: 110 MMHG | RESPIRATION RATE: 18 BRPM | TEMPERATURE: 98 F | HEART RATE: 100 BPM | SYSTOLIC BLOOD PRESSURE: 176 MMHG | OXYGEN SATURATION: 100 %

## 2023-07-17 VITALS
OXYGEN SATURATION: 100 % | TEMPERATURE: 99 F | HEART RATE: 89 BPM | RESPIRATION RATE: 18 BRPM | SYSTOLIC BLOOD PRESSURE: 145 MMHG | DIASTOLIC BLOOD PRESSURE: 89 MMHG

## 2023-07-17 PROCEDURE — 93010 ELECTROCARDIOGRAM REPORT: CPT

## 2023-07-17 PROCEDURE — 99284 EMERGENCY DEPT VISIT MOD MDM: CPT | Mod: 25

## 2023-07-17 PROCEDURE — 10060 I&D ABSCESS SIMPLE/SINGLE: CPT

## 2023-07-18 DIAGNOSIS — Z90.89 ACQUIRED ABSENCE OF OTHER ORGANS: Chronic | ICD-10-CM

## 2023-07-18 RX ORDER — LIDOCAINE HYDROCHLORIDE AND EPINEPHRINE 10; 10 MG/ML; UG/ML
10 INJECTION, SOLUTION INFILTRATION; PERINEURAL ONCE
Refills: 0 | Status: COMPLETED | OUTPATIENT
Start: 2023-07-18 | End: 2023-07-18

## 2023-07-18 RX ORDER — OXYCODONE HYDROCHLORIDE 5 MG/1
5 TABLET ORAL ONCE
Refills: 0 | Status: DISCONTINUED | OUTPATIENT
Start: 2023-07-18 | End: 2023-07-18

## 2023-07-18 RX ORDER — LIDOCAINE HCL 20 MG/ML
10 VIAL (ML) INJECTION ONCE
Refills: 0 | Status: COMPLETED | OUTPATIENT
Start: 2023-07-18 | End: 2023-07-18

## 2023-07-18 RX ORDER — IBUPROFEN 200 MG
600 TABLET ORAL ONCE
Refills: 0 | Status: COMPLETED | OUTPATIENT
Start: 2023-07-18 | End: 2023-07-18

## 2023-07-18 RX ADMIN — Medication 10 MILLILITER(S): at 03:05

## 2023-07-18 RX ADMIN — Medication 600 MILLIGRAM(S): at 03:05

## 2023-07-18 RX ADMIN — OXYCODONE HYDROCHLORIDE 5 MILLIGRAM(S): 5 TABLET ORAL at 03:05

## 2023-07-18 NOTE — ED PROVIDER NOTE - PROGRESS NOTE DETAILS
Pt underwent I&D of abscess in Right groin. Pt will be discharged, prescription for 1 week clindamycin sent to pharmacy.

## 2023-07-18 NOTE — ED PROVIDER NOTE - PATIENT PORTAL LINK FT
You can access the FollowMyHealth Patient Portal offered by [facility] by registering at the following website: [email or web address]. By joining [facility]’s FollowMyHealth portal, you will also be able to view your health information using other applications (apps) compatible with our system.

## 2023-07-18 NOTE — ED PROVIDER NOTE - OBJECTIVE STATEMENT
Pt is a 28 y F w/ a PMHx of hidradenitis suppurativa who comes in with complaints of right groin cyst which has been enlarging over the past 4 days. Pt has long hx of hidradenitis suppurativa which was previously controlled w/ humira; however, due to changes in insurance coverage, pt no longer able to receive medication. Pt denies fevers, chills, chest pain, nausea, vomiting, and diarrhea. Pt is a 28 y F w/ a PMHx of hidradenitis suppurativa who comes in with complaints of right groin swelling which has been enlarging over the past 4 days. Pt has long hx of hidradenitis suppurativa which was previously controlled w/ humira; however, due to changes in insurance coverage, pt no longer able to receive medication. Pt denies fevers, chills, chest pain, nausea, vomiting, and diarrhea.

## 2023-07-18 NOTE — ED PROVIDER NOTE - ATTENDING CONTRIBUTION TO CARE
28-year-old female with history of hidradenitis suppurativa with multiple prior incision and drainage of abscesses, presenting to ER for 4 to 5 days of right upper medial thigh swelling and pain for which patient was concerned she had an abscess.  No dysuria, hematuria, vaginal bleeding or discharge, no pain with defecation, no abdominal pain, no fevers.    Exam  Right upper medial thigh with large area of tenderness with fluctuance about 7 cm at largest size with no erythema  No swelling or tenderness to labia or perineum  Abdomen soft nontender nondistended    Assessment/plan  Skin abscess in patient with hidradenitis suppurativa  P.o. pain medication then bedside incision and drainage with local lidocaine  Will be discharged with p.o. clindamycin as patient is high risk for recurrence and large abscess size present on exam

## 2023-07-18 NOTE — ED PROVIDER NOTE - CLINICAL SUMMARY MEDICAL DECISION MAKING FREE TEXT BOX
Pt is a 28 y F w/ a PMHx of hidradenitis suppurativa who comes in with complaints of right groin cyst which has been enlarging over the past 4 days. Physical exam notable for 7 cm x 4 cm bullae in R. medial thigh, tender to palpation, no erythema. Will drain Pt is a 28 y F w/ a PMHx of hidradenitis suppurativa who comes in with complaints of right groin swelling which has been enlarging over the past 4 days. Physical exam notable for 7 cm x 4 cm abscess in R. medial thigh, tender to palpation, no erythema. Will drain

## 2023-07-18 NOTE — ED PROVIDER NOTE - NSFOLLOWUPINSTRUCTIONS_ED_ALL_ED_FT
You came to the hospital because of a right groin abscess. This was drained in the ED. Please take 1 week of course of antibiotics as prescribed. Please make sure to finish the antibiotics. If you have tenderness in the same area, or you develop a fever or chills, please return to the hospital.

## 2023-07-18 NOTE — ED ADULT NURSE NOTE - OBJECTIVE STATEMENT
Pt A&Ox4 ambulatory c/o right groin cyst which has been enlarging over the past 4 days. Pt has long hx of hidradenitis suppurativa which was previously controlled w/ humira; however, due to changes in insurance coverage, pt no longer able to receive medication. Pt denies fevers, chills, chest pain, nausea, vomiting, and diarrhea. Pt seen by provider and discharged home.

## 2024-02-23 ENCOUNTER — EMERGENCY (EMERGENCY)
Facility: HOSPITAL | Age: 29
LOS: 0 days | Discharge: HOME | End: 2024-02-23
Attending: STUDENT IN AN ORGANIZED HEALTH CARE EDUCATION/TRAINING PROGRAM
Payer: MEDICAID

## 2024-02-23 VITALS — TEMPERATURE: 99 F

## 2024-02-23 VITALS
RESPIRATION RATE: 16 BRPM | TEMPERATURE: 99 F | DIASTOLIC BLOOD PRESSURE: 110 MMHG | HEIGHT: 63 IN | WEIGHT: 270.07 LBS | HEART RATE: 96 BPM | OXYGEN SATURATION: 100 % | SYSTOLIC BLOOD PRESSURE: 207 MMHG

## 2024-02-23 DIAGNOSIS — Z20.822 CONTACT WITH AND (SUSPECTED) EXPOSURE TO COVID-19: ICD-10-CM

## 2024-02-23 DIAGNOSIS — R06.02 SHORTNESS OF BREATH: ICD-10-CM

## 2024-02-23 DIAGNOSIS — R42 DIZZINESS AND GIDDINESS: ICD-10-CM

## 2024-02-23 DIAGNOSIS — R11.0 NAUSEA: ICD-10-CM

## 2024-02-23 DIAGNOSIS — R07.89 OTHER CHEST PAIN: ICD-10-CM

## 2024-02-23 DIAGNOSIS — R51.9 HEADACHE, UNSPECIFIED: ICD-10-CM

## 2024-02-23 DIAGNOSIS — I10 ESSENTIAL (PRIMARY) HYPERTENSION: ICD-10-CM

## 2024-02-23 DIAGNOSIS — F17.200 NICOTINE DEPENDENCE, UNSPECIFIED, UNCOMPLICATED: ICD-10-CM

## 2024-02-23 DIAGNOSIS — Z90.89 ACQUIRED ABSENCE OF OTHER ORGANS: Chronic | ICD-10-CM

## 2024-02-23 DIAGNOSIS — T50.2X6A UNDERDOSING OF CARBONIC-ANHYDRASE INHIBITORS, BENZOTHIADIAZIDES AND OTHER DIURETICS, INITIAL ENCOUNTER: ICD-10-CM

## 2024-02-23 DIAGNOSIS — Z91.128 PATIENT'S INTENTIONAL UNDERDOSING OF MEDICATION REGIMEN FOR OTHER REASON: ICD-10-CM

## 2024-02-23 DIAGNOSIS — R07.9 CHEST PAIN, UNSPECIFIED: ICD-10-CM

## 2024-02-23 LAB
ALBUMIN SERPL ELPH-MCNC: 3.3 G/DL — SIGNIFICANT CHANGE UP (ref 3.3–5)
ALP SERPL-CCNC: 77 U/L — SIGNIFICANT CHANGE UP (ref 40–120)
ALT FLD-CCNC: 34 U/L — SIGNIFICANT CHANGE UP (ref 12–78)
ANION GAP SERPL CALC-SCNC: 4 MMOL/L — LOW (ref 5–17)
ANISOCYTOSIS BLD QL: SIGNIFICANT CHANGE UP
AST SERPL-CCNC: 22 U/L — SIGNIFICANT CHANGE UP (ref 15–37)
BASOPHILS # BLD AUTO: 0 K/UL — SIGNIFICANT CHANGE UP (ref 0–0.2)
BASOPHILS NFR BLD AUTO: 0 % — SIGNIFICANT CHANGE UP (ref 0–2)
BILIRUB SERPL-MCNC: 0.4 MG/DL — SIGNIFICANT CHANGE UP (ref 0.2–1.2)
BUN SERPL-MCNC: 10 MG/DL — SIGNIFICANT CHANGE UP (ref 7–23)
CALCIUM SERPL-MCNC: 8.9 MG/DL — SIGNIFICANT CHANGE UP (ref 8.5–10.1)
CHLORIDE SERPL-SCNC: 109 MMOL/L — HIGH (ref 96–108)
CO2 SERPL-SCNC: 25 MMOL/L — SIGNIFICANT CHANGE UP (ref 22–31)
CREAT SERPL-MCNC: 0.91 MG/DL — SIGNIFICANT CHANGE UP (ref 0.5–1.3)
EGFR: 87 ML/MIN/1.73M2 — SIGNIFICANT CHANGE UP
EOSINOPHIL # BLD AUTO: 0 K/UL — SIGNIFICANT CHANGE UP (ref 0–0.5)
EOSINOPHIL NFR BLD AUTO: 0 % — SIGNIFICANT CHANGE UP (ref 0–6)
FLUAV AG NPH QL: SIGNIFICANT CHANGE UP
FLUBV AG NPH QL: SIGNIFICANT CHANGE UP
GLUCOSE SERPL-MCNC: 90 MG/DL — SIGNIFICANT CHANGE UP (ref 70–99)
HCG SERPL-ACNC: <1 MIU/ML — SIGNIFICANT CHANGE UP
HCG UR QL: NEGATIVE — SIGNIFICANT CHANGE UP
HCT VFR BLD CALC: 37.2 % — SIGNIFICANT CHANGE UP (ref 34.5–45)
HGB BLD-MCNC: 12.5 G/DL — SIGNIFICANT CHANGE UP (ref 11.5–15.5)
LYMPHOCYTES # BLD AUTO: 3.43 K/UL — HIGH (ref 1–3.3)
LYMPHOCYTES # BLD AUTO: 31 % — SIGNIFICANT CHANGE UP (ref 13–44)
MACROCYTES BLD QL: SIGNIFICANT CHANGE UP
MANUAL SMEAR VERIFICATION: SIGNIFICANT CHANGE UP
MCHC RBC-ENTMCNC: 31 PG — SIGNIFICANT CHANGE UP (ref 27–34)
MCHC RBC-ENTMCNC: 33.6 G/DL — SIGNIFICANT CHANGE UP (ref 32–36)
MCV RBC AUTO: 92.3 FL — SIGNIFICANT CHANGE UP (ref 80–100)
MONOCYTES # BLD AUTO: 0.44 K/UL — SIGNIFICANT CHANGE UP (ref 0–0.9)
MONOCYTES NFR BLD AUTO: 4 % — SIGNIFICANT CHANGE UP (ref 2–14)
NEUTROPHILS # BLD AUTO: 6.2 K/UL — SIGNIFICANT CHANGE UP (ref 1.8–7.4)
NEUTROPHILS NFR BLD AUTO: 56 % — SIGNIFICANT CHANGE UP (ref 43–77)
NRBC # BLD: 0 /100 WBCS — SIGNIFICANT CHANGE UP (ref 0–0)
NRBC # BLD: SIGNIFICANT CHANGE UP /100 WBCS (ref 0–0)
PLAT MORPH BLD: NORMAL — SIGNIFICANT CHANGE UP
PLATELET # BLD AUTO: 336 K/UL — SIGNIFICANT CHANGE UP (ref 150–400)
PLATELET COUNT - ESTIMATE: NORMAL — SIGNIFICANT CHANGE UP
POIKILOCYTOSIS BLD QL AUTO: SLIGHT — SIGNIFICANT CHANGE UP
POTASSIUM SERPL-MCNC: 3.8 MMOL/L — SIGNIFICANT CHANGE UP (ref 3.5–5.3)
POTASSIUM SERPL-SCNC: 3.8 MMOL/L — SIGNIFICANT CHANGE UP (ref 3.5–5.3)
PROT SERPL-MCNC: 8.1 GM/DL — SIGNIFICANT CHANGE UP (ref 6–8.3)
RBC # BLD: 4.03 M/UL — SIGNIFICANT CHANGE UP (ref 3.8–5.2)
RBC # FLD: 14.5 % — SIGNIFICANT CHANGE UP (ref 10.3–14.5)
RBC BLD AUTO: SIGNIFICANT CHANGE UP
SARS-COV-2 RNA SPEC QL NAA+PROBE: SIGNIFICANT CHANGE UP
SMUDGE CELLS # BLD: PRESENT — SIGNIFICANT CHANGE UP
SODIUM SERPL-SCNC: 138 MMOL/L — SIGNIFICANT CHANGE UP (ref 135–145)
TROPONIN I, HIGH SENSITIVITY RESULT: 11.1 NG/L — SIGNIFICANT CHANGE UP
TROPONIN I, HIGH SENSITIVITY RESULT: 11.3 NG/L — SIGNIFICANT CHANGE UP
VARIANT LYMPHS # BLD: 9 % — HIGH (ref 0–6)
WBC # BLD: 11.08 K/UL — HIGH (ref 3.8–10.5)
WBC # FLD AUTO: 11.08 K/UL — HIGH (ref 3.8–10.5)

## 2024-02-23 PROCEDURE — 93010 ELECTROCARDIOGRAM REPORT: CPT

## 2024-02-23 PROCEDURE — 71046 X-RAY EXAM CHEST 2 VIEWS: CPT | Mod: 26

## 2024-02-23 PROCEDURE — 99285 EMERGENCY DEPT VISIT HI MDM: CPT

## 2024-02-23 PROCEDURE — 70450 CT HEAD/BRAIN W/O DYE: CPT | Mod: 26,MC

## 2024-02-23 NOTE — ED PROVIDER NOTE - ATTENDING APP SHARED VISIT CONTRIBUTION OF CARE
I have personally performed a face to face medical and diagnostic evaluation of the patient. I have discussed with and reviewed the MARIELLA's note and agree with the History, ROS, Physical Exam and MDM unless otherwise indicated. A brief summary of my personal evaluation and impression can be found below. I actively participated in the comanagement of this patient with the MARIELLA. I have personally reviewed all orders, study/imaging results, medication orders. I discussed indications for consultant evaluation and consultant recommendations with the MARIELLA when applicable, and have discussed the disposition plan with the MARIELLA.    Marleen COPPOLA: 28F hx of HTN, smoker, elevated BMI presents with a cc of intermittent HA, dizziness, CP SOB nausea intermittently over the last x2 months, last and current episode about a week ago, gradual onset HA also reports is having some SOB and BREWSTER, no OCPS no prior hx of dvt or pe, no new LE swelling no cough or hemoptysis, HA diffuse b/l intermittently has blurry vision and numbness that is diffuse but at this time, Denies numbness, tingling or loss of sensation. Denies loss of motor function. Denies changes in vision.  no nuchal rigidity. is supposed to be taking BP meds but is currently not.     All other ROS negative, except as above and as per HPI and ROS section.    VITALS: Initial triage and subsequent vitals have been reviewed by me.  GEN APPEARANCE: Alert, non-toxic, well-appearing, NAD.  HEAD: Atraumatic.  EYES: PERRLa, EOMI, vision grossly intact.   NECK: Supple  CV: RRR, S1S2, no c/r/m/g. Cap refill <2 seconds. No bruits.   LUNGS: CTAB. No abnormal breath sounds.  ABDOMEN: Soft, NTND. No guarding or rebound.   MSK/EXT: No spinal or extremity point tenderness. No CVA ttp. Pelvis stable. No peripheral edema.  NEURO: Alert, follows commands. Weight bearing normal. Speech normal. Sensation and motor normal x4 extremities. CN2-12 normal, coordination normal, ambulating normally. UE & LE 5/5 b/l.  SKIN: Warm, dry and intact. No rash.  PSYCH: Appropriate    Plan/MDM: exam noted HTN otherwise vss non toxic PE as above ddx, unclear etiology of pts sx, may be sequela of poorly controlled HTN, less likely cva, acs or pe, no fever low c/f infxn, consider hypertensive urgency, plan to check labs cth cxr ekg cardiacs give bp meds, reassess, dispo pending if studies non actionable and pt w improvement would dc w rec to follow up w pmd and take meds as indicated.

## 2024-02-23 NOTE — ED ADULT TRIAGE NOTE - CHIEF COMPLAINT QUOTE
Can she clarify what allergy medicine she was on, as I'm happy to refill this for her! She has what sounds like an allergy or viral process going on that I do not know would benefit from azithromycin so just want to be sure we're treating her appropriately. If possible would recommend a video visit this week to further discuss treatment options. Thanks!   pt AAO x 3 ambulatory  with steady gait c/o chest pain headaches with dizziness and Nausea  on and off x 5 days

## 2024-02-23 NOTE — ED PROVIDER NOTE - PHYSICAL EXAMINATION
General: Well appearing in no acute distress, alert and cooperative  Head: Normocephalic, atraumatic  Eyes: PERRLA, no conjunctival injection, no scleral icterus, EOMI  Neck: Soft and supple, full ROM without pain, no midline tenderness  Cardiac: Regular rate and regular rhythm, no murmurs, no LE edema b/l. No calf tenderness or palpable cords.   Resp: Unlabored respiratory effort, lungs CTAB  Abd: Soft, non-tender, non-distended, no guarding or rebound tenderness  MSK: Spine midline and non-tender, no CVA tenderness   Neuro: AO x 3, moves all extremities symmetrically, Motor strength 5/5 bilaterally UE and LE, sensation grossly intact. No facial droop or pronator drift. Normal gait. Speech is fluent and appropriate.

## 2024-02-23 NOTE — ED PROVIDER NOTE - PROGRESS NOTE DETAILS
DALILA Dumont: Workup reviewed. Labs within normal limits. Trop stable. Viral swab negative. Results endorsed to pt - copy of reports provided to patient.   Shared Decision Making - Reassessment performed. Pt reports feeling better and would like to go home at this time. Advised pt to f/u with PCP and cardiology.   Patient is medically stable for discharge. Strict return precautions given. Patient to follow up with PMD. Patient displays understanding and agreeable with plan, comfortable with discharge plan home. Plan for discharge discussed with Dr. Hawley who agrees with disposition and discharge plan. Pt reports chest tightness, will rpt EKG and obtain rpt trop. Pt also notes recent sick contact with grandmother. Will obtain viral swab and reassess.

## 2024-02-23 NOTE — ED PROVIDER NOTE - PATIENT PORTAL LINK FT
You can access the FollowMyHealth Patient Portal offered by [facility] by registering at the following website: [email or web address]. By joining [employer]’s FollowMyHealth portal, you will also be able to view your health information using other applications (apps) compatible with our system.

## 2024-02-23 NOTE — ED PROVIDER NOTE - CARE PROVIDER_API CALL
Harriett Jackson  Interventional Cardiology  [address] [place] [address]  Phone: [phone]  Fax: [phone]  Follow Up Time:

## 2024-02-23 NOTE — ED PROVIDER NOTE - CLINICAL SUMMARY MEDICAL DECISION MAKING FREE TEXT BOX
Patient currently afebrile, hemodynamically stable, spO2 100%. Based on history and physical, differentials include but are not limited to uncontrolled HTN vs viral URI vs electrolyte derangement. Will evaluate for DEL, ACS, intracranial pathology. Plan to assess patient for acute pathology as listed above with labs, CT, CXR. Will administer medications for symptomatic relief, follow-up on results, and reassess. If workup negative, likely d/c home with PCP f/u.

## 2024-02-23 NOTE — ED PROVIDER NOTE - OBJECTIVE STATEMENT
29 y/o F with pmhx of HTN presents to the ED c/o HA with associated dizziness, CP, SOB, nausea intermittently over the last two months, with current episode starting a week ago. Pt states that HA was gradual in onset, worse on the left side, dull in nature. Pt also reports chest heaviness and shortness of breath that pt states is worse with exertion. Pt hypertensive on arrival to the ED. Pt states she was prescribed low dose of HCTZ which she has not been taking since 12/2023 due to side effects. Denies palpitations, fever/chills, cough/hemoptysis, back pain, neck pain, changes in vision, abd pain, N/V. LNMP 3 weeks ago. Current smoker.

## 2024-02-23 NOTE — ED ADULT NURSE NOTE - CHIEF COMPLAINT QUOTE
pt AAO x 3 ambulatory  with steady gait c/o chest pain headaches with dizziness and Nausea  on and off x 5 days

## 2024-02-23 NOTE — ED PROVIDER NOTE - NSFOLLOWUPINSTRUCTIONS_ED_ALL_ED_FT
Advance activity as tolerated.  Continue all previously prescribed medications as directed unless otherwise instructed.   For pain: Take Tylenol 650mg (Two 325 mg pills) every 4-6 hours as needed for pain or fevers.    Follow up with your primary care physician and cardiology. Mention that you were just discharged from the emergency room and need to be seen immediately - bring copies of your results.  Return to the ER for worsening or persistent symptoms, and/or ANY NEW OR CONCERNING SYMPTOMS.  SEEK IMMEDIATE MEDICAL CARE IF YOU HAVE ANY OF THE FOLLOWING SYMPTOMS: worsening chest pain, coughing up blood, unexplained back/neck/jaw pain, severe abdominal pain, dizziness or lightheadedness, fainting, shortness of breath, sweaty or clammy skin, vomiting, or racing heart beat. These symptoms may represent a serious problem that is an emergency. Do not wait to see if the symptoms will go away. Get medical help right away. Call 911 and do not drive yourself to the hospital.      Hypertension    WHAT YOU NEED TO KNOW:    Hypertension is high blood pressure. Your blood pressure is the force of your blood moving against the walls of your arteries. Hypertension causes your blood pressure to get so high that your heart has to work much harder than normal. This can damage your heart. The cause of hypertension may not be known. This is called essential or primary hypertension. Hypertension caused by another medical condition, such as kidney disease, is called secondary hypertension.    DISCHARGE INSTRUCTIONS:    Call your local emergency number (911 in the [place]) or have someone call if:   •You have chest pain.  •You have any of the following signs of a heart attack: ?Squeezing, pressure, or pain in your chest    ?You may also have any of the following:   ?Discomfort or pain in your back, neck, jaw, stomach, or arm  ?Shortness of breath  ?Nausea or vomiting  ?Lightheadedness or a sudden cold sweat  •You become confused or have trouble speaking.  •You suddenly feel lightheaded or have trouble breathing.      Return to the emergency department if:   •You have a severe headache or vision loss.  •You have weakness in an arm or leg.      Call your doctor or cardiologist if:   •You feel faint, dizzy, confused, or drowsy.  •You have been taking your blood pressure medicine but your pressure is higher than your provider says it should be.  •You have questions or concerns about your condition or care.    Medicines: You may need any of the following:  •Antihypertensives may be used to help lower your blood pressure. Several kinds of medicines are available. Your healthcare provider will choose medicines based on the kind of hypertension you have. You may need more than one type of medicine. Take the medicine exactly as directed.  •Diuretics help decrease extra fluid that collects in your body. This will help lower your blood pressure. You may urinate more often while you take this medicine.  •Cholesterol medicine helps lower your cholesterol level. A low cholesterol level helps prevent heart disease and makes it easier to control your blood pressure.  •Take your medicine as directed. Contact your healthcare provider if you think your medicine is not helping or if you have side effects. Tell him or her if you are allergic to any medicine. Keep a list of the medicines, vitamins, and herbs you take. Include the amounts, and when and why you take them. Bring the list or the pill bottles to follow-up visits. Carry your medicine list with you in case of an emergency.    Follow up with your doctor or cardiologist as directed: You will need to return to have your blood pressure checked and to have other lab tests done. Write down your questions so you remember to ask them during your visits.    Blood Pressure Readings  •Normal blood pressure is 119/79 or lower. Your healthcare provider may only check your blood pressure each year if it stays at a normal level.  •Elevated blood pressure is 120/79 to 129/79. This is sometimes called prehypertension. Your healthcare provider may suggest lifestyle changes to help lower your blood pressure to a normal level. He or she may then check it again in 3 to 6 months.  •Stage 1 hypertension is 130/80 to 139/89. Your provider may recommend lifestyle changes, medication, and checks every 3 to 6 months until your blood pressure is controlled.  •Stage 2 hypertension is 140/90 or higher. Your provider will recommend lifestyle changes and have you take 2 kinds of hypertension medicines. You will also need to have your blood pressure checked monthly until it is controlled.      Manage hypertension:   •Check your blood pressure at home. Avoid smoking, caffeine, and exercise at least 30 minutes before checking your blood pressure. Sit and rest for 5 minutes before you take your blood pressure. Extend your arm and support it on a flat surface. Your arm should be at the same level as your heart. Follow the directions that came with your blood pressure monitor. Check your blood pressure 2 times, 1 minute apart, before you take your medicine in the morning. Also check your blood pressure before your evening meal. Keep a record of your readings and bring it to your follow-up visits. Ask your healthcare provider what your blood pressure should be.    •Manage any other health conditions you have. Health conditions such as diabetes can increase your risk for hypertension. Follow your healthcare provider's instructions and take all your medicines as directed.  •Ask about all medicines. Certain medicines can increase your blood pressure. Examples include oral birth control pills, decongestants, herbal supplements, and NSAIDs, such as ibuprofen. Your healthcare provider can tell you which medicines are safe for you to take. This includes prescription and over-the-counter medicines.      Lifestyle changes you can make to manage hypertension:   Your healthcare provider may recommend you work with a team to manage hypertension. The team may include medical experts such as a dietitian, an exercise or physical therapist, and a behavior therapist. Your family members may be included in helping you create lifestyle changes.    Ways to Lower Your Blood Pressure     •Limit sodium (salt) as directed. Too much sodium can affect your fluid balance. Check labels to find low-sodium or no-salt-added foods. Some low-sodium foods use potassium salts for flavor. Too much potassium can also cause health problems. Your healthcare provider will tell you how much sodium and potassium are safe for you to have in a day. He or she may recommend that you limit sodium to 2,300 mg a day.    •Follow the meal plan recommended by your healthcare provider. A dietitian or your provider can give you more information on low-sodium plans or the DASH (Dietary Approaches to Stop Hypertension) eating plan. The DASH plan is low in sodium, processed sugar, unhealthy fats, and total fat. It is high in potassium, calcium, and fiber. These can be found in vegetables, fruit, and whole-grain foods.    •Be physically active throughout the day. Physical activity, such as exercise, can help control your blood pressure and your weight. Be physically active for at least 30 minutes per day, on most days of the week. Include aerobic activity, such as walking or riding a bicycle. Also include strength training at least 2 times each week. Your healthcare providers can help you create a physical activity plan.     •Decrease stress. This may help lower your blood pressure. Learn ways to relax, such as deep breathing or listening to music.  •Limit alcohol as directed. Alcohol can increase your blood pressure. A drink of alcohol is 12 ounces of beer, 5 ounces of wine, or 1½ ounces of liquor.  •Do not smoke. Nicotine and other chemicals in cigarettes and cigars can increase your blood pressure and also cause lung damage. Ask your healthcare provider for information if you currently smoke and need help to quit. E-cigarettes or smokeless tobacco still contain nicotine. Talk to your healthcare provider before you use these products.

## 2024-02-24 PROBLEM — L73.2 HIDRADENITIS SUPPURATIVA: Chronic | Status: ACTIVE | Noted: 2023-07-18

## 2025-04-20 NOTE — ED ADULT NURSE NOTE - NSFALLRSKASSESSDT_ED_ALL_ED
Pt c/o impaired speech, tremors, weakness on right side starting within the last hour.     Pt reports starting Gabapentin 1 week ago after being diagnosed with sciatica    09-Jan-2023 20:52

## 2025-07-07 NOTE — PATIENT PROFILE ADULT - DO YOU FEEL LIKE HURTING YOURSELF OR OTHERS?
Group Therapy Note    Date: 7/6/2025    Group Start Time: 2000    Group End Time: 2020    Wrap-Up Group Note        Number of Participants in Group & Unit Census:  13/20    Goal of Group: Participate proactively in therapy game involving identifying feelings and mood; discuss important goals once discharged from unit.       Comments:     Patient did not participate in Wrap-Up group, despite staff encouragement and explanation of benefits. Q15 minute safety checks maintained for patient safety and will continue to encourage patient to attend unit programming.          Signature: Pippa Garza   no